# Patient Record
Sex: MALE | URBAN - METROPOLITAN AREA
[De-identification: names, ages, dates, MRNs, and addresses within clinical notes are randomized per-mention and may not be internally consistent; named-entity substitution may affect disease eponyms.]

---

## 2023-05-18 ENCOUNTER — OFFICE VISIT (OUTPATIENT)
Dept: URBAN - METROPOLITAN AREA CLINIC 43 | Facility: CLINIC | Age: 88
End: 2023-05-18
Payer: MEDICARE

## 2023-05-18 DIAGNOSIS — H25.812 COMBINED FORMS OF AGE-RELATED CATARACT, LEFT EYE: ICD-10-CM

## 2023-05-18 DIAGNOSIS — H43.813 VITREOUS DEGENERATION, BILATERAL: ICD-10-CM

## 2023-05-18 DIAGNOSIS — H31.091 OTHER CHORIORETINAL SCARS, RIGHT EYE: ICD-10-CM

## 2023-05-18 DIAGNOSIS — H40.1134 PRIMARY OPEN-ANGLE GLAUCOMA, INDETERMINATE, BILATERAL: Primary | ICD-10-CM

## 2023-05-18 PROCEDURE — 92134 CPTRZ OPH DX IMG PST SGM RTA: CPT | Performed by: OPTOMETRIST

## 2023-05-18 PROCEDURE — 99204 OFFICE O/P NEW MOD 45 MIN: CPT | Performed by: OPTOMETRIST

## 2023-05-18 ASSESSMENT — VISUAL ACUITY
OD: 20/25
OS: 20/40

## 2023-05-18 ASSESSMENT — KERATOMETRY
OS: 43.88
OD: 44.13

## 2023-05-18 ASSESSMENT — INTRAOCULAR PRESSURE
OD: 18
OS: 15

## 2023-05-18 NOTE — IMPRESSION/PLAN
Impression: Combined forms of age-related cataract, left eye: H25.812. Plan:  Discussed cataracts with patient. Discussed treatment options. Surgical treatment is recommended. Surgical risks and benefits discussed. Patient elects surgical treatment. Recommend surgery OS. standard lens, ORA, Aim OS: plano. Patient will need glasses for full time wear.

## 2023-05-18 NOTE — IMPRESSION/PLAN
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  Plan: IOP 18,15 on Lumigan QHS OU 
cupping OD>OS
RTC in 3-4 weeks for IOP check with VF/RNFL/PACHS

## 2023-05-18 NOTE — IMPRESSION/PLAN
Impression: Other chorioretinal scars, right eye: H31.091. Plan: h/o laser.  retina attached 360 monitor

## 2023-07-07 ENCOUNTER — OFFICE VISIT (OUTPATIENT)
Dept: URBAN - METROPOLITAN AREA CLINIC 43 | Facility: CLINIC | Age: 88
End: 2023-07-07
Payer: MEDICARE

## 2023-07-07 DIAGNOSIS — H40.1132 PRIMARY OPEN-ANGLE GLAUCOMA, BILATERAL, MODERATE STAGE: ICD-10-CM

## 2023-07-07 DIAGNOSIS — H40.1134 PRIMARY OPEN-ANGLE GLAUCOMA, BILATERAL, INDETERMINATE STAGE: Primary | ICD-10-CM

## 2023-07-07 DIAGNOSIS — H25.812 COMBINED FORMS OF AGE-RELATED CATARACT, LEFT EYE: ICD-10-CM

## 2023-07-07 PROCEDURE — 76514 ECHO EXAM OF EYE THICKNESS: CPT | Performed by: OPTOMETRIST

## 2023-07-07 PROCEDURE — 99213 OFFICE O/P EST LOW 20 MIN: CPT | Performed by: OPTOMETRIST

## 2023-07-07 PROCEDURE — 92133 CPTRZD OPH DX IMG PST SGM ON: CPT | Performed by: OPTOMETRIST

## 2023-07-07 PROCEDURE — 92083 EXTENDED VISUAL FIELD XM: CPT | Performed by: OPTOMETRIST

## 2023-07-07 ASSESSMENT — KERATOMETRY
OD: 44.38
OS: 44.63

## 2023-07-07 ASSESSMENT — INTRAOCULAR PRESSURE
OS: 15
OD: 17

## 2023-07-07 NOTE — IMPRESSION/PLAN
Impression: Combined forms of age-related cataract, left eye: H25.812. Plan: Discussed cataracts with patient. Discussed treatment options. Surgical treatment is recommended. Surgical risks and benefits discussed. Patient elects surgical treatment. Recommend surgery OS. standard lens, ORA, Aim OS: plano. Patient will need glasses for full time wear.   Recommend cat sx by glc specialist

## 2023-07-07 NOTE — IMPRESSION/PLAN
Impression: Primary open-angle glaucoma, bilateral, indeterminate stage: H40.1134.  Plan: see other plan

## 2023-07-07 NOTE — IMPRESSION/PLAN
Impression: Primary open-angle glaucoma, bilateral, moderate stage: P70.7432.
OD>>OS pachs avg Plan: cupping OU IOP midteens on lumigan qhs OU (pt was on lumigan prior to cat sx), question on [facility]iance HVF today: reliable, OD: dense sup and inf arcuate RNFL today: OD: sup and inf thin, 63, OS: bdl sup and temporal thin (73
cont lumigan qhs OU, return for cat sx